# Patient Record
Sex: FEMALE | Race: WHITE | NOT HISPANIC OR LATINO | Employment: FULL TIME | URBAN - METROPOLITAN AREA
[De-identification: names, ages, dates, MRNs, and addresses within clinical notes are randomized per-mention and may not be internally consistent; named-entity substitution may affect disease eponyms.]

---

## 2023-05-16 DIAGNOSIS — E13.69 OTHER SPECIFIED DIABETES MELLITUS WITH OTHER SPECIFIED COMPLICATION, UNSPECIFIED WHETHER LONG TERM INSULIN USE (MULTI): ICD-10-CM

## 2023-05-16 DIAGNOSIS — E78.5 HYPERLIPIDEMIA, UNSPECIFIED HYPERLIPIDEMIA TYPE: ICD-10-CM

## 2023-05-16 DIAGNOSIS — I10 BENIGN ESSENTIAL HYPERTENSION: ICD-10-CM

## 2023-05-16 PROBLEM — R00.2 PALPITATIONS: Status: ACTIVE | Noted: 2023-05-16

## 2023-05-16 PROBLEM — H10.10 ALLERGIC CONJUNCTIVITIS: Status: ACTIVE | Noted: 2023-05-16

## 2023-05-16 PROBLEM — E11.9 DIABETES MELLITUS (MULTI): Status: ACTIVE | Noted: 2023-05-16

## 2023-05-16 PROBLEM — L71.9 ROSACEA: Status: ACTIVE | Noted: 2023-05-16

## 2023-05-16 PROBLEM — F51.01 IDIOPATHIC INSOMNIA: Status: ACTIVE | Noted: 2023-05-16

## 2023-05-16 PROBLEM — J02.9 PHARYNGITIS: Status: ACTIVE | Noted: 2023-05-16

## 2023-05-16 PROBLEM — L21.9 SEBORRHEA: Status: ACTIVE | Noted: 2023-05-16

## 2023-05-16 PROBLEM — J40 BRONCHITIS: Status: ACTIVE | Noted: 2023-05-16

## 2023-05-16 PROBLEM — N95.1 HOT FLASH, MENOPAUSAL: Status: ACTIVE | Noted: 2023-05-16

## 2023-05-16 RX ORDER — ATORVASTATIN CALCIUM 10 MG/1
1 TABLET, FILM COATED ORAL DAILY
COMMUNITY
Start: 2016-10-17 | End: 2023-05-16 | Stop reason: SDUPTHER

## 2023-05-16 RX ORDER — ATORVASTATIN CALCIUM 10 MG/1
10 TABLET, FILM COATED ORAL DAILY
Qty: 90 TABLET | Refills: 0 | Status: SHIPPED | OUTPATIENT
Start: 2023-05-16 | End: 2023-08-08

## 2023-05-16 RX ORDER — GLIMEPIRIDE 4 MG/1
4 TABLET ORAL 2 TIMES DAILY
Qty: 90 TABLET | Refills: 0 | Status: SHIPPED | OUTPATIENT
Start: 2023-05-16 | End: 2023-07-21 | Stop reason: ALTCHOICE

## 2023-05-16 RX ORDER — METFORMIN HYDROCHLORIDE 1000 MG/1
1000 TABLET ORAL 2 TIMES DAILY
Qty: 90 TABLET | Refills: 0 | Status: SHIPPED | OUTPATIENT
Start: 2023-05-16 | End: 2023-06-27

## 2023-05-16 RX ORDER — METFORMIN HYDROCHLORIDE 1000 MG/1
1 TABLET ORAL 2 TIMES DAILY
COMMUNITY
Start: 2016-09-30 | End: 2023-05-16 | Stop reason: SDUPTHER

## 2023-05-16 RX ORDER — LISINOPRIL 5 MG/1
1 TABLET ORAL DAILY
COMMUNITY
Start: 2019-11-18 | End: 2023-05-16 | Stop reason: SDUPTHER

## 2023-05-16 RX ORDER — GLIMEPIRIDE 4 MG/1
1 TABLET ORAL 2 TIMES DAILY
COMMUNITY
Start: 2016-10-17 | End: 2023-05-16 | Stop reason: SDUPTHER

## 2023-05-16 RX ORDER — LISINOPRIL 5 MG/1
5 TABLET ORAL DAILY
Qty: 90 TABLET | Refills: 0 | Status: SHIPPED | OUTPATIENT
Start: 2023-05-16 | End: 2023-08-08

## 2023-06-27 DIAGNOSIS — I10 BENIGN ESSENTIAL HYPERTENSION: ICD-10-CM

## 2023-06-27 DIAGNOSIS — E13.69 OTHER SPECIFIED DIABETES MELLITUS WITH OTHER SPECIFIED COMPLICATION, UNSPECIFIED WHETHER LONG TERM INSULIN USE (MULTI): ICD-10-CM

## 2023-06-27 DIAGNOSIS — E78.5 HYPERLIPIDEMIA, UNSPECIFIED HYPERLIPIDEMIA TYPE: ICD-10-CM

## 2023-06-27 RX ORDER — METFORMIN HYDROCHLORIDE 1000 MG/1
TABLET ORAL
Qty: 180 TABLET | Refills: 0 | Status: SHIPPED | OUTPATIENT
Start: 2023-06-27 | End: 2023-07-21 | Stop reason: SDUPTHER

## 2023-06-30 ENCOUNTER — APPOINTMENT (OUTPATIENT)
Dept: PRIMARY CARE | Facility: CLINIC | Age: 58
End: 2023-06-30
Payer: COMMERCIAL

## 2023-07-21 ENCOUNTER — OFFICE VISIT (OUTPATIENT)
Dept: PRIMARY CARE | Facility: CLINIC | Age: 58
End: 2023-07-21
Payer: COMMERCIAL

## 2023-07-21 VITALS
OXYGEN SATURATION: 97 % | SYSTOLIC BLOOD PRESSURE: 154 MMHG | HEART RATE: 98 BPM | BODY MASS INDEX: 29.82 KG/M2 | WEIGHT: 190 LBS | HEIGHT: 67 IN | DIASTOLIC BLOOD PRESSURE: 83 MMHG

## 2023-07-21 DIAGNOSIS — E13.69 OTHER SPECIFIED DIABETES MELLITUS WITH OTHER SPECIFIED COMPLICATION, UNSPECIFIED WHETHER LONG TERM INSULIN USE (MULTI): ICD-10-CM

## 2023-07-21 DIAGNOSIS — E78.5 HYPERLIPIDEMIA, UNSPECIFIED HYPERLIPIDEMIA TYPE: ICD-10-CM

## 2023-07-21 DIAGNOSIS — E11.9 TYPE 2 DIABETES MELLITUS WITHOUT COMPLICATION, WITHOUT LONG-TERM CURRENT USE OF INSULIN (MULTI): ICD-10-CM

## 2023-07-21 DIAGNOSIS — Z12.31 VISIT FOR SCREENING MAMMOGRAM: ICD-10-CM

## 2023-07-21 DIAGNOSIS — E78.5 DYSLIPIDEMIA, GOAL LDL BELOW 100: ICD-10-CM

## 2023-07-21 DIAGNOSIS — I10 BENIGN ESSENTIAL HYPERTENSION: Primary | ICD-10-CM

## 2023-07-21 DIAGNOSIS — N76.1 CHRONIC VAGINITIS: ICD-10-CM

## 2023-07-21 PROCEDURE — 87205 SMEAR GRAM STAIN: CPT

## 2023-07-21 PROCEDURE — 3077F SYST BP >= 140 MM HG: CPT | Performed by: INTERNAL MEDICINE

## 2023-07-21 PROCEDURE — 3079F DIAST BP 80-89 MM HG: CPT | Performed by: INTERNAL MEDICINE

## 2023-07-21 PROCEDURE — 99214 OFFICE O/P EST MOD 30 MIN: CPT | Performed by: INTERNAL MEDICINE

## 2023-07-21 PROCEDURE — 4010F ACE/ARB THERAPY RXD/TAKEN: CPT | Performed by: INTERNAL MEDICINE

## 2023-07-21 PROCEDURE — 1036F TOBACCO NON-USER: CPT | Performed by: INTERNAL MEDICINE

## 2023-07-21 RX ORDER — EMPAGLIFLOZIN 10 MG/1
1 TABLET, FILM COATED ORAL DAILY
COMMUNITY
Start: 2022-09-26 | End: 2023-08-01 | Stop reason: DRUGHIGH

## 2023-07-21 RX ORDER — ERGOCALCIFEROL 1.25 MG/1
CAPSULE ORAL
COMMUNITY

## 2023-07-21 RX ORDER — MULTIVITAMIN
TABLET ORAL
COMMUNITY
Start: 2002-10-03

## 2023-07-21 RX ORDER — IBUPROFEN 100 MG/5ML
1000 SUSPENSION, ORAL (FINAL DOSE FORM) ORAL DAILY
COMMUNITY

## 2023-07-21 RX ORDER — METFORMIN HYDROCHLORIDE 1000 MG/1
1000 TABLET ORAL 2 TIMES DAILY
Qty: 180 TABLET | Refills: 1 | Status: SHIPPED | OUTPATIENT
Start: 2023-07-21 | End: 2024-03-07

## 2023-07-21 ASSESSMENT — PATIENT HEALTH QUESTIONNAIRE - PHQ9
1. LITTLE INTEREST OR PLEASURE IN DOING THINGS: NOT AT ALL
SUM OF ALL RESPONSES TO PHQ9 QUESTIONS 1 AND 2: 0
2. FEELING DOWN, DEPRESSED OR HOPELESS: NOT AT ALL

## 2023-07-21 NOTE — PROGRESS NOTES
"Subjective   Patient ID: Bethany Mullen is a 57 y.o. female who presents for Annual Exam.    Sugar has been good, just broke her glucometer, needs a new one  Checks sugar couple a week  In the morning was   78 to 200 was the range    She started taking magnesium was getting cramps in her calfs.   Not helping  She drinks a lot of water.   She says pickle juice helps    She has never had a yeast infection in the past, has had some irritation, but no discharge.     Going next Friday for her labs  She has no sex drive.   Her last period was 5 years ago,   Has having poor libido for about 5 years.   Sex is not painful, but just recently    No cp or pressure  No sob  Bowels are regular  Urine leakage, cough, sneeze, laugh, just a dribble  No spotting    She had eczema, is using lubriderm and helping         Review of Systems    Objective   /87   Pulse 98   Ht 1.689 m (5' 6.5\")   Wt 86.2 kg (190 lb)   SpO2 97%   BMI 30.21 kg/m²     Physical Exam  Constitutional:       Appearance: Normal appearance.   Neck:      Vascular: No carotid bruit.   Cardiovascular:      Rate and Rhythm: Normal rate and regular rhythm.      Pulses: Normal pulses.      Heart sounds: No murmur heard.  Pulmonary:      Effort: Pulmonary effort is normal.      Breath sounds: No wheezing or rhonchi.   Chest:   Breasts:     Right: No mass or skin change.      Left: No mass or skin change.      Comments: Fibrocystic changes noted  Abdominal:      General: Abdomen is flat.      Palpations: Abdomen is soft. There is no mass.      Tenderness: There is no abdominal tenderness.      Comments: No HSM   Genitourinary:     Comments: Inner labia majora with thickening and pale base with areas that look abraised, red  Musculoskeletal:      Right lower leg: No edema.      Left lower leg: No edema.   Lymphadenopathy:      Cervical: No cervical adenopathy.   Skin:     Coloration: Skin is not jaundiced or pale.      Comments: Eczematous lesion, looks " "numular   Neurological:      Mental Status: She is alert and oriented to person, place, and time.   Psychiatric:      Comments: anxious         Assessment/Plan          Patient Instructions   Diabetes,   Get A1c  Get annual eye exam    Cholesterol, get fasting labs    Call [phone] to schedule mammogram    Colonoscopy due in 2026, biopsy normal in 2016    Bp high today, check at home 2-3 times a week, send in readings in 2-3 weeks    I recommend that you get the shingrix shots. Shingles vaccination requires a 2 shots series divided by 2 to 6 months. Get at the pharmacy. Ask the pharmacist \"how much\" prior to injected due cost varies based on your insurance. Shingrix can make you feel tired and achy for a few days after so do not get it prior to a big event     Need PAP in 2024, see Dr. Grider    Vaginal itching- checking for yeast due to jardiance, but looks like lichen sclerosis, see info  If negative for yeast would treat with clobetasol cream       "

## 2023-07-21 NOTE — PATIENT INSTRUCTIONS
"Diabetes,   Get A1c  Get annual eye exam    Cholesterol, get fasting labs    Call [phone] to schedule mammogram    Colonoscopy due in 2026, biopsy normal in 2016    Bp high today, check at home 2-3 times a week, send in readings in 2-3 weeks    I recommend that you get the shingrix shots. Shingles vaccination requires a 2 shots series divided by 2 to 6 months. Get at the pharmacy. Ask the pharmacist \"how much\" prior to injected due cost varies based on your insurance. Shingrix can make you feel tired and achy for a few days after so do not get it prior to a big event     Need PAP in 2024, see Dr. Grider    Vaginal itching- checking for yeast due to jardiance, but looks like lichen sclerosis, see info  If negative for yeast would treat with clobetasol cream      "

## 2023-07-22 LAB
CLUE CELLS: ABNORMAL
NUGENT SCORE: 4
VAGINITIS-BV + YEAST INTERPRETATION: ABNORMAL
YEAST: ABNORMAL

## 2023-07-25 DIAGNOSIS — N90.4 LICHEN SCLEROSUS OF FEMALE GENITALIA: Primary | ICD-10-CM

## 2023-07-25 RX ORDER — CLOBETASOL PROPIONATE 0.5 MG/G
OINTMENT TOPICAL 2 TIMES DAILY
Qty: 30 G | Refills: 0 | Status: SHIPPED | OUTPATIENT
Start: 2023-07-25 | End: 2024-03-21

## 2023-07-28 ENCOUNTER — LAB (OUTPATIENT)
Dept: LAB | Facility: LAB | Age: 58
End: 2023-07-28
Payer: COMMERCIAL

## 2023-07-28 DIAGNOSIS — E78.5 HYPERLIPIDEMIA, UNSPECIFIED HYPERLIPIDEMIA TYPE: ICD-10-CM

## 2023-07-28 DIAGNOSIS — E13.69 OTHER SPECIFIED DIABETES MELLITUS WITH OTHER SPECIFIED COMPLICATION, UNSPECIFIED WHETHER LONG TERM INSULIN USE (MULTI): ICD-10-CM

## 2023-07-28 LAB
ALANINE AMINOTRANSFERASE (SGPT) (U/L) IN SER/PLAS: 31 U/L (ref 7–45)
ALBUMIN (G/DL) IN SER/PLAS: 4.8 G/DL (ref 3.4–5)
ALKALINE PHOSPHATASE (U/L) IN SER/PLAS: 76 U/L (ref 33–110)
ANION GAP IN SER/PLAS: 16 MMOL/L (ref 10–20)
ASPARTATE AMINOTRANSFERASE (SGOT) (U/L) IN SER/PLAS: 22 U/L (ref 9–39)
BASOPHILS (10*3/UL) IN BLOOD BY AUTOMATED COUNT: 0.02 X10E9/L (ref 0–0.1)
BASOPHILS/100 LEUKOCYTES IN BLOOD BY AUTOMATED COUNT: 0.5 % (ref 0–2)
BILIRUBIN TOTAL (MG/DL) IN SER/PLAS: 0.7 MG/DL (ref 0–1.2)
CALCIUM (MG/DL) IN SER/PLAS: 9.9 MG/DL (ref 8.6–10.3)
CARBON DIOXIDE, TOTAL (MMOL/L) IN SER/PLAS: 29 MMOL/L (ref 21–32)
CHLORIDE (MMOL/L) IN SER/PLAS: 100 MMOL/L (ref 98–107)
CHOLESTEROL (MG/DL) IN SER/PLAS: 162 MG/DL (ref 0–199)
CHOLESTEROL IN HDL (MG/DL) IN SER/PLAS: 45.9 MG/DL
CHOLESTEROL/HDL RATIO: 3.5
CREATINE KINASE (U/L) IN SER/PLAS: 61 U/L (ref 0–215)
CREATININE (MG/DL) IN SER/PLAS: 0.55 MG/DL (ref 0.5–1.05)
EOSINOPHILS (10*3/UL) IN BLOOD BY AUTOMATED COUNT: 0.11 X10E9/L (ref 0–0.7)
EOSINOPHILS/100 LEUKOCYTES IN BLOOD BY AUTOMATED COUNT: 2.9 % (ref 0–6)
ERYTHROCYTE DISTRIBUTION WIDTH (RATIO) BY AUTOMATED COUNT: 12.2 % (ref 11.5–14.5)
ERYTHROCYTE MEAN CORPUSCULAR HEMOGLOBIN CONCENTRATION (G/DL) BY AUTOMATED: 32.4 G/DL (ref 32–36)
ERYTHROCYTE MEAN CORPUSCULAR VOLUME (FL) BY AUTOMATED COUNT: 91 FL (ref 80–100)
ERYTHROCYTES (10*6/UL) IN BLOOD BY AUTOMATED COUNT: 5.18 X10E12/L (ref 4–5.2)
ESTIMATED AVERAGE GLUCOSE FOR HBA1C: 217 MG/DL
GFR FEMALE: >90 ML/MIN/1.73M2
GLUCOSE (MG/DL) IN SER/PLAS: 186 MG/DL (ref 74–99)
HEMATOCRIT (%) IN BLOOD BY AUTOMATED COUNT: 47.2 % (ref 36–46)
HEMOGLOBIN (G/DL) IN BLOOD: 15.3 G/DL (ref 12–16)
HEMOGLOBIN A1C/HEMOGLOBIN TOTAL IN BLOOD: 9.2 %
IMMATURE GRANULOCYTES/100 LEUKOCYTES IN BLOOD BY AUTOMATED COUNT: 0.3 % (ref 0–0.9)
LDL: 85 MG/DL (ref 0–99)
LEUKOCYTES (10*3/UL) IN BLOOD BY AUTOMATED COUNT: 3.7 X10E9/L (ref 4.4–11.3)
LYMPHOCYTES (10*3/UL) IN BLOOD BY AUTOMATED COUNT: 0.9 X10E9/L (ref 1.2–4.8)
LYMPHOCYTES/100 LEUKOCYTES IN BLOOD BY AUTOMATED COUNT: 24.1 % (ref 13–44)
MONOCYTES (10*3/UL) IN BLOOD BY AUTOMATED COUNT: 0.31 X10E9/L (ref 0.1–1)
MONOCYTES/100 LEUKOCYTES IN BLOOD BY AUTOMATED COUNT: 8.3 % (ref 2–10)
NEUTROPHILS (10*3/UL) IN BLOOD BY AUTOMATED COUNT: 2.38 X10E9/L (ref 1.2–7.7)
NEUTROPHILS/100 LEUKOCYTES IN BLOOD BY AUTOMATED COUNT: 63.9 % (ref 40–80)
PLATELETS (10*3/UL) IN BLOOD AUTOMATED COUNT: 177 X10E9/L (ref 150–450)
POTASSIUM (MMOL/L) IN SER/PLAS: 4.1 MMOL/L (ref 3.5–5.3)
PROTEIN TOTAL: 7.1 G/DL (ref 6.4–8.2)
SODIUM (MMOL/L) IN SER/PLAS: 141 MMOL/L (ref 136–145)
THYROTROPIN (MIU/L) IN SER/PLAS BY DETECTION LIMIT <= 0.05 MIU/L: 3.19 MIU/L (ref 0.44–3.98)
TRIGLYCERIDE (MG/DL) IN SER/PLAS: 154 MG/DL (ref 0–149)
UREA NITROGEN (MG/DL) IN SER/PLAS: 14 MG/DL (ref 6–23)
VLDL: 31 MG/DL (ref 0–40)

## 2023-07-28 PROCEDURE — 36415 COLL VENOUS BLD VENIPUNCTURE: CPT

## 2023-07-28 PROCEDURE — 85025 COMPLETE CBC W/AUTO DIFF WBC: CPT

## 2023-07-28 PROCEDURE — 82550 ASSAY OF CK (CPK): CPT

## 2023-07-28 PROCEDURE — 84443 ASSAY THYROID STIM HORMONE: CPT

## 2023-07-28 PROCEDURE — 80053 COMPREHEN METABOLIC PANEL: CPT

## 2023-07-28 PROCEDURE — 83036 HEMOGLOBIN GLYCOSYLATED A1C: CPT

## 2023-07-28 PROCEDURE — 80061 LIPID PANEL: CPT

## 2023-08-01 DIAGNOSIS — E11.9 TYPE 2 DIABETES MELLITUS WITHOUT COMPLICATION, WITHOUT LONG-TERM CURRENT USE OF INSULIN (MULTI): Primary | ICD-10-CM

## 2023-08-08 DIAGNOSIS — E13.69 OTHER SPECIFIED DIABETES MELLITUS WITH OTHER SPECIFIED COMPLICATION, UNSPECIFIED WHETHER LONG TERM INSULIN USE (MULTI): ICD-10-CM

## 2023-08-08 DIAGNOSIS — E78.5 HYPERLIPIDEMIA, UNSPECIFIED HYPERLIPIDEMIA TYPE: ICD-10-CM

## 2023-08-08 DIAGNOSIS — I10 BENIGN ESSENTIAL HYPERTENSION: ICD-10-CM

## 2023-08-08 RX ORDER — ATORVASTATIN CALCIUM 10 MG/1
10 TABLET, FILM COATED ORAL DAILY
Qty: 90 TABLET | Refills: 0 | Status: SHIPPED | OUTPATIENT
Start: 2023-08-08 | End: 2023-11-25

## 2023-08-08 RX ORDER — LISINOPRIL 5 MG/1
5 TABLET ORAL DAILY
Qty: 90 TABLET | Refills: 0 | Status: SHIPPED | OUTPATIENT
Start: 2023-08-08 | End: 2023-11-25

## 2023-09-23 LAB
HSV 1 PCR QUAL, SKIN/MUCOSA: DETECTED
HSV 2 PCR QUAL, SKIN/MUCOSA: NOT DETECTED

## 2023-10-01 ENCOUNTER — PHARMACY VISIT (OUTPATIENT)
Dept: PHARMACY | Facility: CLINIC | Age: 58
End: 2023-10-01
Payer: COMMERCIAL

## 2023-10-02 ENCOUNTER — PHARMACY VISIT (OUTPATIENT)
Dept: PHARMACY | Facility: CLINIC | Age: 58
End: 2023-10-02
Payer: COMMERCIAL

## 2023-10-02 PROCEDURE — RXMED WILLOW AMBULATORY MEDICATION CHARGE

## 2023-11-01 DIAGNOSIS — E11.9 TYPE 2 DIABETES MELLITUS WITHOUT COMPLICATION, WITHOUT LONG-TERM CURRENT USE OF INSULIN (MULTI): ICD-10-CM

## 2023-11-01 NOTE — TELEPHONE ENCOUNTER
Requested Prescriptions     Pending Prescriptions Disp Refills    empagliflozin (Jardiance) 25 mg 90 tablet 0     Sig: TAKE 1 TABLET BY MOUTH ONCE DAILY

## 2023-11-03 DIAGNOSIS — E11.9 TYPE 2 DIABETES MELLITUS WITHOUT COMPLICATION, WITHOUT LONG-TERM CURRENT USE OF INSULIN (MULTI): ICD-10-CM

## 2023-11-23 DIAGNOSIS — E78.5 HYPERLIPIDEMIA, UNSPECIFIED HYPERLIPIDEMIA TYPE: ICD-10-CM

## 2023-11-23 DIAGNOSIS — I10 BENIGN ESSENTIAL HYPERTENSION: ICD-10-CM

## 2023-11-23 DIAGNOSIS — E13.69 OTHER SPECIFIED DIABETES MELLITUS WITH OTHER SPECIFIED COMPLICATION, UNSPECIFIED WHETHER LONG TERM INSULIN USE (MULTI): ICD-10-CM

## 2023-11-25 RX ORDER — ATORVASTATIN CALCIUM 10 MG/1
10 TABLET, FILM COATED ORAL DAILY
Qty: 90 TABLET | Refills: 1 | Status: SHIPPED | OUTPATIENT
Start: 2023-11-25 | End: 2024-05-30

## 2023-11-25 RX ORDER — LISINOPRIL 5 MG/1
5 TABLET ORAL DAILY
Qty: 90 TABLET | Refills: 3 | Status: SHIPPED | OUTPATIENT
Start: 2023-11-25

## 2023-11-30 ENCOUNTER — PHARMACY VISIT (OUTPATIENT)
Dept: PHARMACY | Facility: CLINIC | Age: 58
End: 2023-11-30
Payer: COMMERCIAL

## 2023-11-30 PROCEDURE — RXMED WILLOW AMBULATORY MEDICATION CHARGE

## 2023-12-29 ENCOUNTER — APPOINTMENT (OUTPATIENT)
Dept: PRIMARY CARE | Facility: CLINIC | Age: 58
End: 2023-12-29
Payer: COMMERCIAL

## 2024-01-03 PROCEDURE — RXMED WILLOW AMBULATORY MEDICATION CHARGE

## 2024-01-04 ENCOUNTER — PHARMACY VISIT (OUTPATIENT)
Dept: PHARMACY | Facility: CLINIC | Age: 59
End: 2024-01-04
Payer: COMMERCIAL

## 2024-01-20 ENCOUNTER — LAB (OUTPATIENT)
Dept: LAB | Facility: LAB | Age: 59
End: 2024-01-20
Payer: COMMERCIAL

## 2024-01-20 DIAGNOSIS — E11.9 TYPE 2 DIABETES MELLITUS WITHOUT COMPLICATION, WITHOUT LONG-TERM CURRENT USE OF INSULIN (MULTI): ICD-10-CM

## 2024-01-20 LAB
EST. AVERAGE GLUCOSE BLD GHB EST-MCNC: 189 MG/DL
HBA1C MFR BLD: 8.2 %

## 2024-01-20 PROCEDURE — 36415 COLL VENOUS BLD VENIPUNCTURE: CPT

## 2024-01-20 PROCEDURE — 83036 HEMOGLOBIN GLYCOSYLATED A1C: CPT

## 2024-01-26 ENCOUNTER — OFFICE VISIT (OUTPATIENT)
Dept: PRIMARY CARE | Facility: CLINIC | Age: 59
End: 2024-01-26
Payer: COMMERCIAL

## 2024-01-26 VITALS
DIASTOLIC BLOOD PRESSURE: 88 MMHG | HEART RATE: 87 BPM | HEIGHT: 67 IN | BODY MASS INDEX: 28.88 KG/M2 | SYSTOLIC BLOOD PRESSURE: 138 MMHG | WEIGHT: 184 LBS

## 2024-01-26 DIAGNOSIS — E11.65 TYPE 2 DIABETES MELLITUS WITH HYPERGLYCEMIA, WITHOUT LONG-TERM CURRENT USE OF INSULIN (MULTI): Primary | ICD-10-CM

## 2024-01-26 DIAGNOSIS — I10 BENIGN ESSENTIAL HYPERTENSION: ICD-10-CM

## 2024-01-26 PROBLEM — D22.5 MELANOCYTIC NEVI OF TRUNK: Status: ACTIVE | Noted: 2018-10-19

## 2024-01-26 PROBLEM — D22.60 MELANOCYTIC NEVI OF UNSPECIFIED UPPER LIMB, INCLUDING SHOULDER: Status: ACTIVE | Noted: 2018-10-19

## 2024-01-26 PROBLEM — L91.8 OTHER HYPERTROPHIC DISORDERS OF THE SKIN: Status: ACTIVE | Noted: 2018-10-19

## 2024-01-26 PROBLEM — L82.0 INFLAMED SEBORRHEIC KERATOSIS: Status: ACTIVE | Noted: 2018-10-19

## 2024-01-26 PROBLEM — L82.1 OTHER SEBORRHEIC KERATOSIS: Status: ACTIVE | Noted: 2018-10-19

## 2024-01-26 PROBLEM — Z86.39 HISTORY OF DIABETES MELLITUS: Status: ACTIVE | Noted: 2024-01-26

## 2024-01-26 PROBLEM — L81.4 OTHER MELANIN HYPERPIGMENTATION: Status: ACTIVE | Noted: 2018-10-19

## 2024-01-26 PROBLEM — D18.01 HEMANGIOMA OF SKIN AND SUBCUTANEOUS TISSUE: Status: ACTIVE | Noted: 2018-10-19

## 2024-01-26 PROCEDURE — 90686 IIV4 VACC NO PRSV 0.5 ML IM: CPT | Performed by: INTERNAL MEDICINE

## 2024-01-26 PROCEDURE — 1036F TOBACCO NON-USER: CPT | Performed by: INTERNAL MEDICINE

## 2024-01-26 PROCEDURE — 90471 IMMUNIZATION ADMIN: CPT | Performed by: INTERNAL MEDICINE

## 2024-01-26 PROCEDURE — 4010F ACE/ARB THERAPY RXD/TAKEN: CPT | Performed by: INTERNAL MEDICINE

## 2024-01-26 PROCEDURE — 3079F DIAST BP 80-89 MM HG: CPT | Performed by: INTERNAL MEDICINE

## 2024-01-26 PROCEDURE — 3052F HG A1C>EQUAL 8.0%<EQUAL 9.0%: CPT | Performed by: INTERNAL MEDICINE

## 2024-01-26 PROCEDURE — 3075F SYST BP GE 130 - 139MM HG: CPT | Performed by: INTERNAL MEDICINE

## 2024-01-26 PROCEDURE — 99214 OFFICE O/P EST MOD 30 MIN: CPT | Performed by: INTERNAL MEDICINE

## 2024-01-26 NOTE — PROGRESS NOTES
Subjective   Patient ID: Bethany Mullen is a 58 y.o. female who presents for Annual Exam.    HPI     Review of Systems    Objective   There were no vitals taken for this visit.    Physical Exam    Assessment/Plan

## 2024-01-26 NOTE — PATIENT INSTRUCTIONS
Diabetes, not well controlled with metformin and jardiance at max doses  Great job with checking sugar, check before dinner once a week.  Get regular exercise, walking is good.   Add ozempic 0.25mg  injected weekly for 4 weeks, send message or call to let me know if okay to increase to 0.5mg weekly for 1 month and if tolerated will increase to 1 mg weekly    Recheck in July for 6 month recheck    Bp is good    Get shingrix shots, 2 shot series, divided by 2 to 6 months    Flu shot today.  
BMP/CBC/PT/PTT/INR/EKG

## 2024-01-26 NOTE — PROGRESS NOTES
"Subjective   Patient ID: Bethany Mullen is a 58 y.o. female who presents for Annual Exam.    She has been watching her diet well  Her sugar is never under 100, has been 100-230, checks first thing in the morning.   She doesn't check every day.   Rarely checks her sugar later in the day.   She does so well at work during the day with her diet, at night, after dinner, she would like something to curb her appetite.   A friend of hers who was on ozempic.   No personal or family history of thyroid cancer  She does not exercise in the winter, and has had stress at work    No cp or pressure  No sob  No LH or palp  Bowels are regular  No UTI symptoms or vaginal yeast infection    She had an episdoe of herpes. Never had before.     Home bp is 126/70         Review of Systems    Objective   /87   Pulse 87   Ht 1.689 m (5' 6.5\")   Wt 83.5 kg (184 lb)   BMI 29.25 kg/m²     Physical Exam  Constitutional:       Appearance: Normal appearance.   Neck:      Vascular: No carotid bruit.   Cardiovascular:      Rate and Rhythm: Normal rate and regular rhythm.      Heart sounds: No murmur heard.  Pulmonary:      Effort: Pulmonary effort is normal.      Breath sounds: Normal breath sounds.   Abdominal:      General: Abdomen is flat.      Palpations: Abdomen is soft.      Tenderness: There is no abdominal tenderness.   Musculoskeletal:      Right lower leg: No edema.      Left lower leg: No edema.   Skin:     Coloration: Skin is not jaundiced or pale.      Comments: Plaques/thick on back    Neurological:      Mental Status: She is alert and oriented to person, place, and time.   Psychiatric:         Mood and Affect: Mood normal.         Assessment/Plan          Patient Instructions   Diabetes, not well controlled with metformin and jardiance at max doses  Great job with checking sugar, check before dinner once a week.  Get regular exercise, walking is good.   Add ozempic 0.25mg  injected weekly for 4 weeks, send message or " call to let me know if okay to increase to 0.5mg weekly for 1 month and if tolerated will increase to 1 mg weekly    Recheck in July for 6 month recheck    Bp is good    Get shingrix shots, 2 shot series, divided by 2 to 6 months    Flu shot today.

## 2024-02-01 PROCEDURE — RXMED WILLOW AMBULATORY MEDICATION CHARGE

## 2024-02-02 ENCOUNTER — PHARMACY VISIT (OUTPATIENT)
Dept: PHARMACY | Facility: CLINIC | Age: 59
End: 2024-02-02
Payer: COMMERCIAL

## 2024-02-02 PROCEDURE — RXMED WILLOW AMBULATORY MEDICATION CHARGE

## 2024-02-05 DIAGNOSIS — E11.9 TYPE 2 DIABETES MELLITUS WITHOUT COMPLICATION, WITHOUT LONG-TERM CURRENT USE OF INSULIN (MULTI): ICD-10-CM

## 2024-03-04 PROCEDURE — RXMED WILLOW AMBULATORY MEDICATION CHARGE

## 2024-03-06 ENCOUNTER — PHARMACY VISIT (OUTPATIENT)
Dept: PHARMACY | Facility: CLINIC | Age: 59
End: 2024-03-06
Payer: COMMERCIAL

## 2024-03-07 DIAGNOSIS — E78.5 HYPERLIPIDEMIA, UNSPECIFIED HYPERLIPIDEMIA TYPE: ICD-10-CM

## 2024-03-07 DIAGNOSIS — I10 BENIGN ESSENTIAL HYPERTENSION: ICD-10-CM

## 2024-03-07 DIAGNOSIS — E13.69 OTHER SPECIFIED DIABETES MELLITUS WITH OTHER SPECIFIED COMPLICATION, UNSPECIFIED WHETHER LONG TERM INSULIN USE (MULTI): ICD-10-CM

## 2024-03-07 RX ORDER — METFORMIN HYDROCHLORIDE 1000 MG/1
1000 TABLET ORAL 2 TIMES DAILY
Qty: 180 TABLET | Refills: 0 | Status: SHIPPED | OUTPATIENT
Start: 2024-03-07

## 2024-04-08 PROCEDURE — RXMED WILLOW AMBULATORY MEDICATION CHARGE

## 2024-04-11 ENCOUNTER — PHARMACY VISIT (OUTPATIENT)
Dept: PHARMACY | Facility: CLINIC | Age: 59
End: 2024-04-11
Payer: COMMERCIAL

## 2024-05-20 PROCEDURE — RXMED WILLOW AMBULATORY MEDICATION CHARGE

## 2024-05-22 ENCOUNTER — PHARMACY VISIT (OUTPATIENT)
Dept: PHARMACY | Facility: CLINIC | Age: 59
End: 2024-05-22
Payer: COMMERCIAL

## 2024-05-29 DIAGNOSIS — I10 BENIGN ESSENTIAL HYPERTENSION: ICD-10-CM

## 2024-05-29 DIAGNOSIS — E78.5 HYPERLIPIDEMIA, UNSPECIFIED HYPERLIPIDEMIA TYPE: ICD-10-CM

## 2024-05-29 DIAGNOSIS — E13.69 OTHER SPECIFIED DIABETES MELLITUS WITH OTHER SPECIFIED COMPLICATION, UNSPECIFIED WHETHER LONG TERM INSULIN USE (MULTI): ICD-10-CM

## 2024-05-30 DIAGNOSIS — E11.9 TYPE 2 DIABETES MELLITUS WITHOUT COMPLICATION, WITHOUT LONG-TERM CURRENT USE OF INSULIN (MULTI): Primary | ICD-10-CM

## 2024-05-30 DIAGNOSIS — E78.5 DYSLIPIDEMIA, GOAL LDL BELOW 100: ICD-10-CM

## 2024-05-30 RX ORDER — ATORVASTATIN CALCIUM 10 MG/1
10 TABLET, FILM COATED ORAL DAILY
Qty: 90 TABLET | Refills: 0 | Status: SHIPPED | OUTPATIENT
Start: 2024-05-30

## 2024-06-14 ENCOUNTER — LAB (OUTPATIENT)
Dept: LAB | Facility: LAB | Age: 59
End: 2024-06-14
Payer: COMMERCIAL

## 2024-06-14 DIAGNOSIS — E11.9 TYPE 2 DIABETES MELLITUS WITHOUT COMPLICATION, WITHOUT LONG-TERM CURRENT USE OF INSULIN (MULTI): ICD-10-CM

## 2024-06-14 DIAGNOSIS — I10 BENIGN ESSENTIAL HYPERTENSION: ICD-10-CM

## 2024-06-14 DIAGNOSIS — E78.5 HYPERLIPIDEMIA, UNSPECIFIED HYPERLIPIDEMIA TYPE: ICD-10-CM

## 2024-06-14 DIAGNOSIS — E13.69 OTHER SPECIFIED DIABETES MELLITUS WITH OTHER SPECIFIED COMPLICATION, UNSPECIFIED WHETHER LONG TERM INSULIN USE (MULTI): ICD-10-CM

## 2024-06-14 DIAGNOSIS — E11.65 TYPE 2 DIABETES MELLITUS WITH HYPERGLYCEMIA, WITHOUT LONG-TERM CURRENT USE OF INSULIN (MULTI): ICD-10-CM

## 2024-06-14 DIAGNOSIS — E78.5 DYSLIPIDEMIA, GOAL LDL BELOW 100: ICD-10-CM

## 2024-06-14 LAB
ALBUMIN SERPL BCP-MCNC: 4.8 G/DL (ref 3.4–5)
ALP SERPL-CCNC: 65 U/L (ref 33–110)
ALT SERPL W P-5'-P-CCNC: 36 U/L (ref 7–45)
ANION GAP SERPL CALC-SCNC: 15 MMOL/L (ref 10–20)
AST SERPL W P-5'-P-CCNC: 29 U/L (ref 9–39)
BASOPHILS # BLD AUTO: 0.02 X10*3/UL (ref 0–0.1)
BASOPHILS NFR BLD AUTO: 0.6 %
BILIRUB SERPL-MCNC: 0.7 MG/DL (ref 0–1.2)
BUN SERPL-MCNC: 24 MG/DL (ref 6–23)
CALCIUM SERPL-MCNC: 9.8 MG/DL (ref 8.6–10.3)
CHLORIDE SERPL-SCNC: 97 MMOL/L (ref 98–107)
CHOLEST SERPL-MCNC: 173 MG/DL (ref 0–199)
CHOLESTEROL/HDL RATIO: 3.9
CK SERPL-CCNC: 75 U/L (ref 0–215)
CO2 SERPL-SCNC: 29 MMOL/L (ref 21–32)
CREAT SERPL-MCNC: 0.53 MG/DL (ref 0.5–1.05)
EGFRCR SERPLBLD CKD-EPI 2021: >90 ML/MIN/1.73M*2
EOSINOPHIL # BLD AUTO: 0.07 X10*3/UL (ref 0–0.7)
EOSINOPHIL NFR BLD AUTO: 2.1 %
ERYTHROCYTE [DISTWIDTH] IN BLOOD BY AUTOMATED COUNT: 12.5 % (ref 11.5–14.5)
EST. AVERAGE GLUCOSE BLD GHB EST-MCNC: 157 MG/DL
GLUCOSE SERPL-MCNC: 162 MG/DL (ref 74–99)
HBA1C MFR BLD: 7.1 %
HCT VFR BLD AUTO: 46.5 % (ref 36–46)
HDLC SERPL-MCNC: 44.1 MG/DL
HGB BLD-MCNC: 15.2 G/DL (ref 12–16)
IMM GRANULOCYTES # BLD AUTO: 0.01 X10*3/UL (ref 0–0.7)
IMM GRANULOCYTES NFR BLD AUTO: 0.3 % (ref 0–0.9)
LDLC SERPL CALC-MCNC: 106 MG/DL
LYMPHOCYTES # BLD AUTO: 0.79 X10*3/UL (ref 1.2–4.8)
LYMPHOCYTES NFR BLD AUTO: 23.4 %
MCH RBC QN AUTO: 30.5 PG (ref 26–34)
MCHC RBC AUTO-ENTMCNC: 32.7 G/DL (ref 32–36)
MCV RBC AUTO: 93 FL (ref 80–100)
MONOCYTES # BLD AUTO: 0.29 X10*3/UL (ref 0.1–1)
MONOCYTES NFR BLD AUTO: 8.6 %
NEUTROPHILS # BLD AUTO: 2.19 X10*3/UL (ref 1.2–7.7)
NEUTROPHILS NFR BLD AUTO: 65 %
NON HDL CHOLESTEROL: 129 MG/DL (ref 0–149)
NRBC BLD-RTO: 0 /100 WBCS (ref 0–0)
PLATELET # BLD AUTO: 160 X10*3/UL (ref 150–450)
POTASSIUM SERPL-SCNC: 4.4 MMOL/L (ref 3.5–5.3)
PROT SERPL-MCNC: 7.3 G/DL (ref 6.4–8.2)
RBC # BLD AUTO: 4.99 X10*6/UL (ref 4–5.2)
SODIUM SERPL-SCNC: 137 MMOL/L (ref 136–145)
TRIGL SERPL-MCNC: 115 MG/DL (ref 0–149)
TSH SERPL-ACNC: 2.45 MIU/L (ref 0.44–3.98)
VLDL: 23 MG/DL (ref 0–40)
WBC # BLD AUTO: 3.4 X10*3/UL (ref 4.4–11.3)

## 2024-06-14 PROCEDURE — 83036 HEMOGLOBIN GLYCOSYLATED A1C: CPT

## 2024-06-14 PROCEDURE — 82550 ASSAY OF CK (CPK): CPT

## 2024-06-14 PROCEDURE — 80053 COMPREHEN METABOLIC PANEL: CPT

## 2024-06-14 PROCEDURE — 84443 ASSAY THYROID STIM HORMONE: CPT

## 2024-06-14 PROCEDURE — 85025 COMPLETE CBC W/AUTO DIFF WBC: CPT

## 2024-06-14 PROCEDURE — RXMED WILLOW AMBULATORY MEDICATION CHARGE

## 2024-06-14 PROCEDURE — 80061 LIPID PANEL: CPT

## 2024-06-14 RX ORDER — METFORMIN HYDROCHLORIDE 1000 MG/1
1000 TABLET ORAL 2 TIMES DAILY
Qty: 180 TABLET | Refills: 0 | Status: SHIPPED | OUTPATIENT
Start: 2024-06-14

## 2024-06-14 NOTE — TELEPHONE ENCOUNTER
Requested Prescriptions     Pending Prescriptions Disp Refills    semaglutide 0.25 mg or 0.5 mg (2 mg/3 mL) pen injector 3 mL 1     Sig: Inject 0.25 mg under the skin 1 (one) time per week.    metFORMIN (Glucophage) 1,000 mg tablet 180 tablet 0     Sig: Take 1 tablet (1,000 mg) by mouth 2 times a day.

## 2024-06-17 ENCOUNTER — PHARMACY VISIT (OUTPATIENT)
Dept: PHARMACY | Facility: CLINIC | Age: 59
End: 2024-06-17
Payer: COMMERCIAL

## 2024-06-21 PROCEDURE — RXMED WILLOW AMBULATORY MEDICATION CHARGE

## 2024-06-26 ENCOUNTER — PHARMACY VISIT (OUTPATIENT)
Dept: PHARMACY | Facility: CLINIC | Age: 59
End: 2024-06-26
Payer: COMMERCIAL

## 2024-07-09 ENCOUNTER — PHARMACY VISIT (OUTPATIENT)
Dept: PHARMACY | Facility: CLINIC | Age: 59
End: 2024-07-09
Payer: COMMERCIAL

## 2024-07-09 PROCEDURE — RXMED WILLOW AMBULATORY MEDICATION CHARGE

## 2024-07-19 ENCOUNTER — APPOINTMENT (OUTPATIENT)
Dept: PRIMARY CARE | Facility: CLINIC | Age: 59
End: 2024-07-19
Payer: COMMERCIAL

## 2024-07-19 VITALS
SYSTOLIC BLOOD PRESSURE: 128 MMHG | OXYGEN SATURATION: 95 % | WEIGHT: 180 LBS | HEART RATE: 67 BPM | BODY MASS INDEX: 28.62 KG/M2 | DIASTOLIC BLOOD PRESSURE: 74 MMHG

## 2024-07-19 DIAGNOSIS — I10 BENIGN ESSENTIAL HYPERTENSION: ICD-10-CM

## 2024-07-19 DIAGNOSIS — E11.9 TYPE 2 DIABETES MELLITUS WITHOUT COMPLICATION, WITHOUT LONG-TERM CURRENT USE OF INSULIN (MULTI): ICD-10-CM

## 2024-07-19 DIAGNOSIS — E78.00 PURE HYPERCHOLESTEROLEMIA: Primary | ICD-10-CM

## 2024-07-19 DIAGNOSIS — Z12.31 VISIT FOR SCREENING MAMMOGRAM: ICD-10-CM

## 2024-07-19 PROCEDURE — 3078F DIAST BP <80 MM HG: CPT | Performed by: INTERNAL MEDICINE

## 2024-07-19 PROCEDURE — 1036F TOBACCO NON-USER: CPT | Performed by: INTERNAL MEDICINE

## 2024-07-19 PROCEDURE — 3049F LDL-C 100-129 MG/DL: CPT | Performed by: INTERNAL MEDICINE

## 2024-07-19 PROCEDURE — 3074F SYST BP LT 130 MM HG: CPT | Performed by: INTERNAL MEDICINE

## 2024-07-19 PROCEDURE — 99214 OFFICE O/P EST MOD 30 MIN: CPT | Performed by: INTERNAL MEDICINE

## 2024-07-19 PROCEDURE — 3051F HG A1C>EQUAL 7.0%<8.0%: CPT | Performed by: INTERNAL MEDICINE

## 2024-07-19 PROCEDURE — 4010F ACE/ARB THERAPY RXD/TAKEN: CPT | Performed by: INTERNAL MEDICINE

## 2024-07-19 NOTE — PROGRESS NOTES
Subjective   Patient ID: Bethany Mullen is a 58 y.o. female who presents for Follow-up (6 month follow up and go over results ).    HPI     Review of Systems    Objective   There were no vitals taken for this visit.    Physical Exam    Assessment/Plan           Watch your child at all times near play equipment and stairs. If your child is climbing out of his or her crib, change to a toddler bed. · Keep cleaning products and medicines in locked cabinets out of your child's reach. Keep the number for Poison Control (2-363.484.6311) in or near your phone. · Tell your doctor if your child spends a lot of time in a house built before 1978. The paint could have lead in it, which can be harmful. · Help your child brush his or her teeth every day. For children this age, use a tiny amount of toothpaste with fluoride (the size of a grain of rice). Discipline  · Teach your child good behavior. Catch your child being good and respond to that behavior. · Use your body language, such as looking sad, to let your child know you do not like his or her behavior. A child this age [de-identified] misbehave 27 times a day. · Do not spank your child. · If you are having problems with discipline, talk to your doctor to find out what you can do to help your child. Feeding  · Offer a variety of healthy foods each day, including fruits, well-cooked vegetables, low-sugar cereal, yogurt, whole-grain breads and crackers, lean meat, fish, and tofu. Kids need to eat at least every 3 or 4 hours. · Do not give your child foods that may cause choking, such as nuts, whole grapes, hard or sticky candy, or popcorn. · Give your child healthy snacks. Even if your child does not seem to like them at first, keep trying. Buy snack foods made from wheat, corn, rice, oats, or other grains, such as breads, cereals, tortillas, noodles, crackers, and muffins. Immunizations  · Make sure your baby gets all the recommended childhood vaccines. They will help keep your baby healthy and prevent the spread of disease. When should you call for help? Watch closely for changes in your child's health, and be sure to contact your doctor if:  · You are concerned that your child is not growing or developing normally.   · You are kinds of foods and watching out for choking hazards. · Watch your child at all times near the street or in a parking lot. Drivers may not be able to see small children. Know where your child is and check carefully before backing your car out of the driveway. · Watch your child at all times when he or she is near water, including pools, hot tubs, buckets, bathtubs, and toilets. · For every ride in a car, secure your child into a properly installed car seat that meets all current safety standards. For questions about car seats, call the [employer] at 3-366.161.3330. · Make sure your child cannot get burned. Keep hot pots, curling irons, irons, and coffee cups out of his or her reach. Put plastic plugs in all electrical sockets. Put in smoke detectors and check the batteries regularly. · Put locks or guards on all windows above the first floor. Watch your child at all times near play equipment and stairs. If your child is climbing out of his or her crib, change to a toddler bed. · Keep cleaning products and medicines in locked cabinets out of your child's reach. Keep the number for Poison Control (2-361.789.1450) in or near your phone. · Tell your doctor if your child spends a lot of time in a house built before 1978. The paint could have lead in it, which can be harmful. · Help your child brush his or her teeth every day. For children this age, use a tiny amount of toothpaste with fluoride (the size of a grain of rice). Give your child loving discipline  · Use facial expressions and body language to show you are sad or glad about your child's behavior. Shake your head \"no,\" with a adamson look on your face, when your toddler does something you do not like. Reward good behavior with a smile and a positive comment. (\"I like how you play gently with your toys. \")  · Redirect your child.  If your child cannot play with a toy without throwing it, put the toy away and show your child 2017  Content Version: 11.3  © 2529-5125 [employer]. Care instructions adapted under license by [facility] ([facility]). If you have questions about a medical condition or this instruction, always ask your healthcare professional. [address]  any warranty or liability for your use of this information. Follow with specialist as scheduled. Schedule with physical and occupational therapy. Continue with [facility].

## 2024-07-19 NOTE — PROGRESS NOTES
Subjective   Patient ID: Bethany Mullen is a 58 y.o. female who presents for Follow-up (6 month follow up and go over results ).    Her sugars have been 100 to 160  Is on the metformin, cut to 1 daily  Is taking the jardiance and ozempic 0.25mg dose. She pays $25 for ozempic and $10,   No nausea, bowels are regular.   Her goal weight is 160.          Review of Systems    Objective   /89   Pulse 67   Wt 81.6 kg (180 lb)   SpO2 95%   BMI 28.62 kg/m²     Physical Exam  Constitutional:       Appearance: Normal appearance.   Neck:      Vascular: No carotid bruit.   Cardiovascular:      Rate and Rhythm: Normal rate and regular rhythm.      Heart sounds: No murmur heard.  Pulmonary:      Effort: Pulmonary effort is normal.      Breath sounds: Normal breath sounds.   Skin:     Coloration: Skin is not jaundiced or pale.   Neurological:      Mental Status: She is alert.   Psychiatric:         Mood and Affect: Mood normal.         Assessment/Plan     Patient Instructions   Diabetes,   A1c much better  Continue jardiance daily  Continue metformin 500mg daily  Increase ozempic to .5mg weekly  Get annual eye exam    Cholesterol, LDL goal under 100, up slightly, work on diet  If still over 100 in 6 months would increase atorvastatin    Call [phone] to schedule mammogram    Colonoscopy due in 2026, biopsy normal in 2016    Bp normal on repeat    I recommend that you get the shingrix shots. Shingles vaccination requires a 2 shots series divided by 2 to 6 months. Get at the pharmacy.  Shingrix can make you feel tired and achy for a few days after so do not get it prior to a big event     Get Tdap at the pharmacy, recommended every 10 years (plus need pertussis if babysitting your new grandson)    Pap normal in 2023     Night time leg cramps. Fluids, tonic water.

## 2024-07-19 NOTE — PATIENT INSTRUCTIONS
Diabetes,   A1c much better  Continue jardiance daily  Continue metformin 500mg daily  Increase ozempic to .5mg weekly  Get annual eye exam    Cholesterol, LDL goal under 100, up slightly, work on diet  If still over 100 in 6 months would increase atorvastatin    Call [phone] to schedule mammogram    Colonoscopy due in 2026, biopsy normal in 2016    Bp normal on repeat    I recommend that you get the shingrix shots. Shingles vaccination requires a 2 shots series divided by 2 to 6 months. Get at the pharmacy.  Shingrix can make you feel tired and achy for a few days after so do not get it prior to a big event     Get Tdap at the pharmacy, recommended every 10 years (plus need pertussis if babysitting your new grandson)    Pap normal in 2023    Recheck in 6 months    You had the hepatitis b shots

## 2024-07-26 ENCOUNTER — PHARMACY VISIT (OUTPATIENT)
Dept: PHARMACY | Facility: CLINIC | Age: 59
End: 2024-07-26
Payer: COMMERCIAL

## 2024-07-26 PROCEDURE — RXMED WILLOW AMBULATORY MEDICATION CHARGE

## 2024-07-30 PROCEDURE — RXMED WILLOW AMBULATORY MEDICATION CHARGE

## 2024-07-31 ENCOUNTER — PHARMACY VISIT (OUTPATIENT)
Dept: PHARMACY | Facility: CLINIC | Age: 59
End: 2024-07-31
Payer: COMMERCIAL

## 2024-08-02 ENCOUNTER — HOSPITAL ENCOUNTER (OUTPATIENT)
Dept: RADIOLOGY | Facility: HOSPITAL | Age: 59
Discharge: HOME | End: 2024-08-02
Payer: COMMERCIAL

## 2024-08-02 VITALS — HEIGHT: 67 IN | WEIGHT: 178 LBS | BODY MASS INDEX: 27.94 KG/M2

## 2024-08-02 DIAGNOSIS — Z12.31 VISIT FOR SCREENING MAMMOGRAM: ICD-10-CM

## 2024-08-02 PROCEDURE — 77067 SCR MAMMO BI INCL CAD: CPT

## 2024-08-20 PROCEDURE — RXMED WILLOW AMBULATORY MEDICATION CHARGE

## 2024-08-26 ENCOUNTER — PHARMACY VISIT (OUTPATIENT)
Dept: PHARMACY | Facility: CLINIC | Age: 59
End: 2024-08-26
Payer: COMMERCIAL

## 2024-09-10 PROCEDURE — RXMED WILLOW AMBULATORY MEDICATION CHARGE

## 2024-09-12 ENCOUNTER — PHARMACY VISIT (OUTPATIENT)
Dept: PHARMACY | Facility: CLINIC | Age: 59
End: 2024-09-12
Payer: COMMERCIAL

## 2024-09-16 DIAGNOSIS — I10 BENIGN ESSENTIAL HYPERTENSION: ICD-10-CM

## 2024-09-16 DIAGNOSIS — E11.9 TYPE 2 DIABETES MELLITUS WITHOUT COMPLICATION, WITHOUT LONG-TERM CURRENT USE OF INSULIN (MULTI): ICD-10-CM

## 2024-09-16 DIAGNOSIS — E78.5 HYPERLIPIDEMIA, UNSPECIFIED HYPERLIPIDEMIA TYPE: ICD-10-CM

## 2024-09-16 DIAGNOSIS — E13.69 OTHER SPECIFIED DIABETES MELLITUS WITH OTHER SPECIFIED COMPLICATION, UNSPECIFIED WHETHER LONG TERM INSULIN USE (MULTI): ICD-10-CM

## 2024-09-17 RX ORDER — METFORMIN HYDROCHLORIDE 1000 MG/1
1000 TABLET ORAL 2 TIMES DAILY
Qty: 180 TABLET | Refills: 1 | Status: SHIPPED | OUTPATIENT
Start: 2024-09-17

## 2024-09-17 RX ORDER — LISINOPRIL 5 MG/1
5 TABLET ORAL DAILY
Qty: 90 TABLET | Refills: 1 | Status: SHIPPED | OUTPATIENT
Start: 2024-09-17

## 2024-09-17 RX ORDER — ATORVASTATIN CALCIUM 10 MG/1
10 TABLET, FILM COATED ORAL DAILY
Qty: 90 TABLET | Refills: 1 | Status: SHIPPED | OUTPATIENT
Start: 2024-09-17

## 2024-09-18 PROCEDURE — RXMED WILLOW AMBULATORY MEDICATION CHARGE

## 2024-09-26 ENCOUNTER — PHARMACY VISIT (OUTPATIENT)
Dept: PHARMACY | Facility: CLINIC | Age: 59
End: 2024-09-26
Payer: COMMERCIAL

## 2024-10-23 PROCEDURE — RXMED WILLOW AMBULATORY MEDICATION CHARGE

## 2024-10-28 ENCOUNTER — PHARMACY VISIT (OUTPATIENT)
Dept: PHARMACY | Facility: CLINIC | Age: 59
End: 2024-10-28
Payer: COMMERCIAL

## 2024-10-29 ENCOUNTER — PHARMACY VISIT (OUTPATIENT)
Dept: PHARMACY | Facility: CLINIC | Age: 59
End: 2024-10-29
Payer: COMMERCIAL

## 2024-10-29 PROCEDURE — RXMED WILLOW AMBULATORY MEDICATION CHARGE

## 2024-11-19 ENCOUNTER — PHARMACY VISIT (OUTPATIENT)
Dept: PHARMACY | Facility: CLINIC | Age: 59
End: 2024-11-19
Payer: COMMERCIAL

## 2024-11-19 PROCEDURE — RXMED WILLOW AMBULATORY MEDICATION CHARGE

## 2024-11-25 PROCEDURE — RXMED WILLOW AMBULATORY MEDICATION CHARGE

## 2024-12-02 PROCEDURE — RXMED WILLOW AMBULATORY MEDICATION CHARGE

## 2024-12-10 ENCOUNTER — PHARMACY VISIT (OUTPATIENT)
Dept: PHARMACY | Facility: CLINIC | Age: 59
End: 2024-12-10
Payer: COMMERCIAL

## 2024-12-10 PROCEDURE — RXMED WILLOW AMBULATORY MEDICATION CHARGE

## 2024-12-16 ENCOUNTER — PHARMACY VISIT (OUTPATIENT)
Dept: PHARMACY | Facility: CLINIC | Age: 59
End: 2024-12-16
Payer: COMMERCIAL

## 2025-01-02 PROCEDURE — RXMED WILLOW AMBULATORY MEDICATION CHARGE

## 2025-01-08 ENCOUNTER — PHARMACY VISIT (OUTPATIENT)
Dept: PHARMACY | Facility: CLINIC | Age: 60
End: 2025-01-08
Payer: COMMERCIAL

## 2025-01-22 ENCOUNTER — PHARMACY VISIT (OUTPATIENT)
Dept: PHARMACY | Facility: CLINIC | Age: 60
End: 2025-01-22
Payer: COMMERCIAL

## 2025-01-22 PROCEDURE — RXMED WILLOW AMBULATORY MEDICATION CHARGE

## 2025-01-29 ENCOUNTER — TELEPHONE (OUTPATIENT)
Dept: PRIMARY CARE | Facility: CLINIC | Age: 60
End: 2025-01-29
Payer: COMMERCIAL

## 2025-01-29 NOTE — TELEPHONE ENCOUNTER
Pt would like to have her blood work done prior to April ov with CHRISTI RUBY.   Can these be put in the system for her? Stated she is diabetic.  Pt would appreciate call if/when the order is in the system.

## 2025-01-29 NOTE — TELEPHONE ENCOUNTER
mike Kaur routed conversation to [facility] Clinical Support Staff59 minutes ago (3:57 PM)     Carito Kaur59 minutes ago (3:57 PM)     VG  Pt would like to have her blood work done prior to April ov with CHRISTI RUBY.   Can these be put in the system for her? Stated she is diabetic.  Pt would appreciate call if/when the order is in the system.          Note        Bethany Mullen [phone]  Carito Kaur   Pt informed to wait until rtc.

## 2025-02-03 PROCEDURE — RXMED WILLOW AMBULATORY MEDICATION CHARGE

## 2025-02-04 ENCOUNTER — PHARMACY VISIT (OUTPATIENT)
Dept: PHARMACY | Facility: CLINIC | Age: 60
End: 2025-02-04
Payer: COMMERCIAL

## 2025-02-06 DIAGNOSIS — E11.9 TYPE 2 DIABETES MELLITUS WITHOUT COMPLICATION, WITHOUT LONG-TERM CURRENT USE OF INSULIN (MULTI): ICD-10-CM

## 2025-02-12 PROCEDURE — RXMED WILLOW AMBULATORY MEDICATION CHARGE

## 2025-02-17 ENCOUNTER — PHARMACY VISIT (OUTPATIENT)
Dept: PHARMACY | Facility: CLINIC | Age: 60
End: 2025-02-17
Payer: COMMERCIAL

## 2025-02-26 PROCEDURE — RXMED WILLOW AMBULATORY MEDICATION CHARGE

## 2025-03-09 DIAGNOSIS — E13.69 OTHER SPECIFIED DIABETES MELLITUS WITH OTHER SPECIFIED COMPLICATION, UNSPECIFIED WHETHER LONG TERM INSULIN USE (MULTI): ICD-10-CM

## 2025-03-09 DIAGNOSIS — I10 BENIGN ESSENTIAL HYPERTENSION: ICD-10-CM

## 2025-03-09 DIAGNOSIS — E78.5 HYPERLIPIDEMIA, UNSPECIFIED HYPERLIPIDEMIA TYPE: ICD-10-CM

## 2025-03-10 PROCEDURE — RXMED WILLOW AMBULATORY MEDICATION CHARGE

## 2025-03-10 RX ORDER — LISINOPRIL 5 MG/1
5 TABLET ORAL DAILY
Qty: 30 TABLET | Refills: 0 | Status: SHIPPED | OUTPATIENT
Start: 2025-03-10

## 2025-03-10 RX ORDER — ATORVASTATIN CALCIUM 10 MG/1
10 TABLET, FILM COATED ORAL DAILY
Qty: 30 TABLET | Refills: 0 | Status: SHIPPED | OUTPATIENT
Start: 2025-03-10

## 2025-03-11 PROCEDURE — RXMED WILLOW AMBULATORY MEDICATION CHARGE

## 2025-03-17 ENCOUNTER — PHARMACY VISIT (OUTPATIENT)
Dept: PHARMACY | Facility: CLINIC | Age: 60
End: 2025-03-17
Payer: COMMERCIAL

## 2025-04-09 DIAGNOSIS — E11.9 TYPE 2 DIABETES MELLITUS WITHOUT COMPLICATION, WITHOUT LONG-TERM CURRENT USE OF INSULIN: ICD-10-CM

## 2025-04-09 PROCEDURE — RXMED WILLOW AMBULATORY MEDICATION CHARGE

## 2025-04-09 RX ORDER — SEMAGLUTIDE 0.68 MG/ML
0.5 INJECTION, SOLUTION SUBCUTANEOUS
Qty: 3 ML | Refills: 5 | OUTPATIENT
Start: 2025-04-13

## 2025-04-10 DIAGNOSIS — E11.9 TYPE 2 DIABETES MELLITUS WITHOUT COMPLICATION, WITHOUT LONG-TERM CURRENT USE OF INSULIN: ICD-10-CM

## 2025-04-10 RX ORDER — SEMAGLUTIDE 0.68 MG/ML
0.5 INJECTION, SOLUTION SUBCUTANEOUS
Qty: 3 ML | Refills: 5 | Status: CANCELLED | OUTPATIENT
Start: 2025-04-13

## 2025-04-11 ENCOUNTER — PHARMACY VISIT (OUTPATIENT)
Dept: PHARMACY | Facility: CLINIC | Age: 60
End: 2025-04-11
Payer: COMMERCIAL

## 2025-04-15 PROBLEM — L21.9 SEBORRHEIC DERMATITIS: Status: ACTIVE | Noted: 2025-04-15

## 2025-04-15 PROBLEM — H10.10 ALLERGIC CONJUNCTIVITIS: Status: RESOLVED | Noted: 2023-05-16 | Resolved: 2025-04-15

## 2025-04-15 PROBLEM — J02.9 PHARYNGITIS: Status: RESOLVED | Noted: 2023-05-16 | Resolved: 2025-04-15

## 2025-04-15 PROBLEM — J40 BRONCHITIS: Status: RESOLVED | Noted: 2023-05-16 | Resolved: 2025-04-15

## 2025-04-17 ENCOUNTER — APPOINTMENT (OUTPATIENT)
Dept: PRIMARY CARE | Facility: CLINIC | Age: 60
End: 2025-04-17
Payer: COMMERCIAL

## 2025-04-18 ENCOUNTER — APPOINTMENT (OUTPATIENT)
Dept: PRIMARY CARE | Facility: CLINIC | Age: 60
End: 2025-04-18
Payer: COMMERCIAL

## 2025-04-18 VITALS
HEIGHT: 67 IN | BODY MASS INDEX: 27.65 KG/M2 | OXYGEN SATURATION: 97 % | HEART RATE: 99 BPM | WEIGHT: 176.2 LBS | SYSTOLIC BLOOD PRESSURE: 128 MMHG | DIASTOLIC BLOOD PRESSURE: 64 MMHG

## 2025-04-18 DIAGNOSIS — E55.9 VITAMIN D DEFICIENCY: ICD-10-CM

## 2025-04-18 DIAGNOSIS — E11.9 TYPE 2 DIABETES MELLITUS WITHOUT COMPLICATION, WITHOUT LONG-TERM CURRENT USE OF INSULIN: ICD-10-CM

## 2025-04-18 DIAGNOSIS — Z13.220 ENCOUNTER FOR LIPID SCREENING FOR CARDIOVASCULAR DISEASE: ICD-10-CM

## 2025-04-18 DIAGNOSIS — R25.2 LEG CRAMPS: ICD-10-CM

## 2025-04-18 DIAGNOSIS — I10 BENIGN ESSENTIAL HYPERTENSION: ICD-10-CM

## 2025-04-18 DIAGNOSIS — Z13.220 SCREENING FOR CHOLESTEROL LEVEL: ICD-10-CM

## 2025-04-18 DIAGNOSIS — R00.2 PALPITATIONS: ICD-10-CM

## 2025-04-18 DIAGNOSIS — Z00.00 ROUTINE ADULT HEALTH MAINTENANCE: Primary | ICD-10-CM

## 2025-04-18 DIAGNOSIS — E78.5 HYPERLIPIDEMIA, UNSPECIFIED HYPERLIPIDEMIA TYPE: ICD-10-CM

## 2025-04-18 DIAGNOSIS — Z79.899 ON LONG TERM DRUG THERAPY: ICD-10-CM

## 2025-04-18 DIAGNOSIS — Z13.6 ENCOUNTER FOR LIPID SCREENING FOR CARDIOVASCULAR DISEASE: ICD-10-CM

## 2025-04-18 DIAGNOSIS — E13.69 OTHER SPECIFIED DIABETES MELLITUS WITH OTHER SPECIFIED COMPLICATION, UNSPECIFIED WHETHER LONG TERM INSULIN USE (MULTI): ICD-10-CM

## 2025-04-18 DIAGNOSIS — Z13.29 SCREENING FOR THYROID DISORDER: ICD-10-CM

## 2025-04-18 DIAGNOSIS — Z12.31 ENCOUNTER FOR SCREENING MAMMOGRAM FOR MALIGNANT NEOPLASM OF BREAST: ICD-10-CM

## 2025-04-18 DIAGNOSIS — Z13.21 ENCOUNTER FOR VITAMIN DEFICIENCY SCREENING: ICD-10-CM

## 2025-04-18 LAB — POC HEMOGLOBIN A1C: 7.5 % (ref 4.2–6.5)

## 2025-04-18 PROCEDURE — 3074F SYST BP LT 130 MM HG: CPT | Performed by: NURSE PRACTITIONER

## 2025-04-18 PROCEDURE — RXMED WILLOW AMBULATORY MEDICATION CHARGE

## 2025-04-18 PROCEDURE — 3051F HG A1C>EQUAL 7.0%<8.0%: CPT | Performed by: NURSE PRACTITIONER

## 2025-04-18 PROCEDURE — 3008F BODY MASS INDEX DOCD: CPT | Performed by: NURSE PRACTITIONER

## 2025-04-18 PROCEDURE — 3078F DIAST BP <80 MM HG: CPT | Performed by: NURSE PRACTITIONER

## 2025-04-18 PROCEDURE — 99396 PREV VISIT EST AGE 40-64: CPT | Performed by: NURSE PRACTITIONER

## 2025-04-18 PROCEDURE — 4010F ACE/ARB THERAPY RXD/TAKEN: CPT | Performed by: NURSE PRACTITIONER

## 2025-04-18 PROCEDURE — 83036 HEMOGLOBIN GLYCOSYLATED A1C: CPT | Performed by: NURSE PRACTITIONER

## 2025-04-18 RX ORDER — ATORVASTATIN CALCIUM 10 MG/1
10 TABLET, FILM COATED ORAL DAILY
Qty: 90 TABLET | Refills: 1 | Status: SHIPPED | OUTPATIENT
Start: 2025-04-18 | End: 2025-10-15

## 2025-04-18 RX ORDER — METFORMIN HYDROCHLORIDE 1000 MG/1
1000 TABLET ORAL 2 TIMES DAILY
Qty: 180 TABLET | Refills: 1 | Status: SHIPPED | OUTPATIENT
Start: 2025-04-18

## 2025-04-18 RX ORDER — LISINOPRIL 5 MG/1
5 TABLET ORAL DAILY
Qty: 90 TABLET | Refills: 1 | Status: SHIPPED | OUTPATIENT
Start: 2025-04-18 | End: 2025-10-15

## 2025-04-18 RX ORDER — CLOBETASOL PROPIONATE 0.5 MG/G
1 OINTMENT TOPICAL 2 TIMES DAILY
COMMUNITY

## 2025-04-18 ASSESSMENT — ENCOUNTER SYMPTOMS
DIZZINESS: 0
BRUISES/BLEEDS EASILY: 0
ARTHRALGIAS: 1
HEADACHES: 0
DIARRHEA: 0
EYE PAIN: 0
TROUBLE SWALLOWING: 0
WHEEZING: 0
JOINT SWELLING: 0
FEVER: 0
ABDOMINAL DISTENTION: 0
ADENOPATHY: 0
DEPRESSION: 0
PALPITATIONS: 0
NAUSEA: 0
SEIZURES: 0
MYALGIAS: 0
OCCASIONAL FEELINGS OF UNSTEADINESS: 0
COLOR CHANGE: 0
CONSTIPATION: 0
CHILLS: 0
SHORTNESS OF BREATH: 0
COUGH: 0
WOUND: 0
ABDOMINAL PAIN: 0
DIFFICULTY URINATING: 0
LOSS OF SENSATION IN FEET: 0
WEAKNESS: 0
FATIGUE: 0
BACK PAIN: 1

## 2025-04-18 ASSESSMENT — COLUMBIA-SUICIDE SEVERITY RATING SCALE - C-SSRS
2. HAVE YOU ACTUALLY HAD ANY THOUGHTS OF KILLING YOURSELF?: NO
6. HAVE YOU EVER DONE ANYTHING, STARTED TO DO ANYTHING, OR PREPARED TO DO ANYTHING TO END YOUR LIFE?: NO
1. IN THE PAST MONTH, HAVE YOU WISHED YOU WERE DEAD OR WISHED YOU COULD GO TO SLEEP AND NOT WAKE UP?: NO

## 2025-04-18 ASSESSMENT — PATIENT HEALTH QUESTIONNAIRE - PHQ9
1. LITTLE INTEREST OR PLEASURE IN DOING THINGS: NOT AT ALL
2. FEELING DOWN, DEPRESSED OR HOPELESS: NOT AT ALL
SUM OF ALL RESPONSES TO PHQ9 QUESTIONS 1 AND 2: 0

## 2025-04-18 NOTE — PROGRESS NOTES
Do you have:  Any eye problems:  Y  2. Frequent nasal congestion or sneezing:  N  3. Difficulty hearing:  N  4. Ear problems:   N  Are you troubled by:  5. Asthma or wheezing:   N  6. Frequent cough:   N  7. Shortness of breath:N  8. Hemoptysis: N  9. Hx of TB: N  Do you have or have you been told you had:  10. High blood pressure: Y  11. Heart disease: N  12. Heart murmur: N  Do you ever have:  13. Chest pain or pressure with exertion:N  14. Leg pains with walking up hill: N  15. Fast heartbeat or palpitations:N  16. Varicose veins: N  Do you have or are you troubled by:  17. Difficulty swallowing foods or liquids: N  18. Abdominal pains: N  19. Frequent indigestion or heartburn: N  20. Constipation: N  21. Diarrhea or loose stools: N  Has there been a definite change:  22. In weight recently: Y  23. In bowel movements: N  Have you ever had or been told you have:  24. An ulcer: N  25. Black stools: N  26. Jaundice, hepatitis or liver problems: N  27. Gallstones or gallbladder problems: N  28. Stomach or intestinal problems: N  Have you ever:  29. Vomited blood : N  30. Blood in bowel movements: N  31. Been anemic or been treated for blood problems: N  32. Had sickle cell trait or anemia: N  33. Been refused as a blood donor:   Have you had or do you have:  34. Problems with your kidney, bladder, or prostate: N  35. Loss of control of your urine: N  36. Pain or burning with urination: N  37. Blood in your urine: N  38. Trouble starting flow of urine: N  39. Frequent urination at night: N  Have you ever been treated for or told you had:  40. Venereal disease: N  Do you have:  41. Any skin problems: Y  42. Diabetes: Y  43. Thyroid disease: N  Are you troubled by:  44. Frequent back pain: N  45. Pain or swelling around joints: N  Have you ever:  46. Broken any bones: Y  Are you troubled by:  47. Frequent headaches: N  48. Dizziness: N  49. Had Seizures or convulsions: N  50. Temporarily lost control of your hand or  foot : N   51. Had a stroke or been paralyzed : N  52. Temporarily lost your ability to speak: N  53. Fainted or lost consciousness: N  Have you ever had:  54. Hallucinations: N  55. Much trouble with Nervousness: N  56. Do you take medications for your nerves: N  57. Trouble falling asleep or staying asleep: N  58. Do you feel tired even after a good night sleep: N  59. Do you often feel down in the dumps or depressed: N  60. Do you often feel like crying without any reason: N  61. Do you think that you may be using alcohol excessively: N  62. Do you use any street drugs : N     Do have any other medical problems that are concerning to you :      Subjective   Patient ID: Bethany Mullen is a 59 y.o. female who presents for Annual Exam.    Patient seen today in order to establish primary care as well as complete an annual physical exam.  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  She is  and reports a good support system with family.  Discussed diabetes and although patient does not monitor her blood glucose levels at home she denies any recurrent symptoms related to hypoglycemia or hyperglycemia.  Patient reports walking outside when the weather is nice.  No reported issues with shortness of breath or chest pain upon exertion.  She does not smoke and only minimally drinks alcohol.  Occasional palpitations reported with increased stress.  Patient states these are self-limiting and denies any associated lightheaded/dizziness, chest pain or pressure.  No reported issues with appetite, staying hydrated, bowel or bladder.  Discussed repeating Pap exam and agreed on repeat testing in 2026.  Patient is currently asymptomatic but does have a history of HSV.  She reports intermittent issues with nighttime leg cramps.  Encouraged increased fluid intake, stretching and possible magnesium supplements for improved symptoms.  Patient following routinely with dermatology for history of  "psoriasis.  No other acute skin concerns reported.  Intermittent back/knee pain reported that appears to be tolerable at this time.  No significant issues with mood or insomnia.  Patient denies any major concerns with vision or dentition at this time.  Medications reviewed.  No other acute concerns voiced at this time           Medications Ordered Prior to Encounter[1]    Medical History[2]     Surgical History[3]     Family History[4]     Review of Systems   Constitutional:  Negative for chills, fatigue and fever.   HENT:  Negative for dental problem and trouble swallowing.    Eyes:  Negative for pain and visual disturbance.        Wears glasses   Respiratory:  Negative for cough, shortness of breath and wheezing.    Cardiovascular:  Negative for chest pain, palpitations and leg swelling.   Gastrointestinal:  Negative for abdominal distention, abdominal pain, constipation, diarrhea and nausea.   Endocrine: Negative for cold intolerance and heat intolerance.   Genitourinary:  Negative for difficulty urinating.        Reported nocturia   Musculoskeletal:  Positive for arthralgias and back pain. Negative for gait problem, joint swelling and myalgias.   Skin:  Negative for color change, pallor, rash and wound.   Allergic/Immunologic: Negative for environmental allergies and food allergies.   Neurological:  Negative for dizziness, seizures, weakness and headaches.   Hematological:  Negative for adenopathy. Does not bruise/bleed easily.   Psychiatric/Behavioral:  Negative for behavioral problems.    All other systems reviewed and are negative.      Objective   /64   Pulse 99   Ht 1.689 m (5' 6.5\")   Wt 79.9 kg (176 lb 3.2 oz)   SpO2 97%   BMI 28.01 kg/m²     Physical Exam  Constitutional:       General: She is not in acute distress.     Appearance: Normal appearance. She is not toxic-appearing.   HENT:      Head: Normocephalic and atraumatic.      Right Ear: Tympanic membrane, ear canal and external ear " normal.      Left Ear: Tympanic membrane, ear canal and external ear normal.      Nose: Nose normal.      Mouth/Throat:      Mouth: Mucous membranes are moist.      Pharynx: Oropharynx is clear.   Eyes:      Extraocular Movements: Extraocular movements intact.      Conjunctiva/sclera: Conjunctivae normal.      Pupils: Pupils are equal, round, and reactive to light.   Cardiovascular:      Rate and Rhythm: Normal rate and regular rhythm.      Pulses: Normal pulses.      Heart sounds: Normal heart sounds. No murmur heard.  Pulmonary:      Effort: Pulmonary effort is normal.      Breath sounds: Normal breath sounds. No wheezing.   Abdominal:      General: Bowel sounds are normal.      Palpations: Abdomen is soft.   Musculoskeletal:         General: No swelling. Normal range of motion.      Cervical back: Normal range of motion and neck supple.   Skin:     General: Skin is warm and dry.   Neurological:      General: No focal deficit present.      Mental Status: She is alert and oriented to person, place, and time. Mental status is at baseline.      Cranial Nerves: No cranial nerve deficit.      Motor: No weakness.   Psychiatric:         Mood and Affect: Mood normal.         Behavior: Behavior normal.         Thought Content: Thought content normal.         Judgment: Judgment normal.         Assessment/Plan   Problem List Items Addressed This Visit           ICD-10-CM    Benign essential hypertension I10    Stable on current medication regiment.  Continue to monitor patient's vital signs at subsequent visits.  Provider to be notified for any new cardiac concerns         Relevant Medications    atorvastatin (Lipitor) 10 mg tablet    lisinopril 5 mg tablet    metFORMIN (Glucophage) 1,000 mg tablet    Diabetes mellitus (Multi) E11.9    Lab Results   Component Value Date    HGBA1C 7.5 (A) 04/18/2025     Patient would like to increase Ozempic for additional weight loss purposes.  Provider to be notified for any symptoms  associated with hypoglycemia or hyperglycemia as medication changes may be indicated at this time.  Will continue to monitor patient's hemoglobin A1c routinely for surveillance purposes         Relevant Medications    semaglutide (OZEMPIC) 1 mg/dose (4 mg/3 mL) pen injector    atorvastatin (Lipitor) 10 mg tablet    empagliflozin (Jardiance) 25 mg tablet    lisinopril 5 mg tablet    metFORMIN (Glucophage) 1,000 mg tablet    Other Relevant Orders    POCT glycosylated hemoglobin (Hb A1C) manually resulted (Completed)    Albumin-Creatinine Ratio, Urine Random (Completed)    Comprehensive Metabolic Panel    CBC and Auto Differential    Hyperlipidemia E78.5    Patient continues on daily statin without issue.  Will continue to monitor fasting cholesterol panels routinely for surveillance purposes         Relevant Medications    atorvastatin (Lipitor) 10 mg tablet    lisinopril 5 mg tablet    metFORMIN (Glucophage) 1,000 mg tablet    Palpitations R00.2    Self-limiting in nature and reportedly stress-induced?  Patient to notify provider for any persistent/worsening cardiac concerns as an addition assessment will be indicated at that time         Routine adult health maintenance - Primary Z00.00    Routine blood work ordered today for assessment purposes.  Will continue to monitor as appropriate.  -Colonoscopy due in 2026, biopsy normal in 2016  - Orders placed today for screening mammogram to be completed  -Patient will be due for Pap/pelvic exam in 2026         Relevant Medications    magnesium glycinate 100 mg tablet    Other Relevant Orders    Comprehensive Metabolic Panel    TSH with reflex to Free T4 if abnormal    Lipid Panel    Vitamin D 25-Hydroxy,Total (for eval of Vitamin D levels)    CBC and Auto Differential    BI mammo bilateral screening tomosynthesis    Leg cramps R25.2    Encouraged patient to maintain adequate hydration, stretch prior to sleeping and consider taking low-dose magnesium supplements.  Provider  to be notified for any persistent/worsening leg concerns          Other Visit Diagnoses         Codes      Screening for thyroid disorder     Z13.29    Relevant Orders    TSH with reflex to Free T4 if abnormal      Screening for cholesterol level     Z13.220    Relevant Orders    Lipid Panel      Encounter for lipid screening for cardiovascular disease     Z13.220, Z13.6    Relevant Orders    Lipid Panel      Encounter for vitamin deficiency screening     Z13.21    Relevant Orders    Vitamin D 25-Hydroxy,Total (for eval of Vitamin D levels)      On long term drug therapy     Z79.899    Relevant Orders    Vitamin D 25-Hydroxy,Total (for eval of Vitamin D levels)      Vitamin D deficiency     E55.9    Relevant Orders    Vitamin D 25-Hydroxy,Total (for eval of Vitamin D levels)      Encounter for screening mammogram for malignant neoplasm of breast     Z12.31    Relevant Orders    BI mammo bilateral screening tomosynthesis                    [1]   Current Outpatient Medications on File Prior to Visit   Medication Sig Dispense Refill    ascorbic acid (Vitamin C) 1,000 mg tablet Take 1 tablet (1,000 mg) by mouth once daily.      clobetasol (Temovate) 0.05 % ointment Apply 1 Application topically 2 times a day.      ergocalciferol (Vitamin D2) 1.25 MG (95280 UT) capsule Take by mouth 1 (one) time per week.      multivitamin tablet [facility] woman's vitamin , one daily      [DISCONTINUED] atorvastatin (Lipitor) 10 mg tablet Take 1 tablet (10 mg) by mouth once daily. 30 tablet 0    [DISCONTINUED] empagliflozin (Jardiance) 25 mg tablet TAKE 1 TABLET BY MOUTH ONCE DAILY 90 tablet 0    [DISCONTINUED] lisinopril 5 mg tablet Take 1 tablet (5 mg) by mouth once daily. 30 tablet 0    [DISCONTINUED] magnesium 30 mg tablet Take 1 tablet (30 mg) by mouth 2 times a day.      [DISCONTINUED] metFORMIN (Glucophage) 1,000 mg tablet Take 1 tablet (1,000 mg) by mouth 2 times a day. 180 tablet 1    [DISCONTINUED] semaglutide 0.25 mg or 0.5 mg (2  mg/3 mL) pen injector Inject 0.5 mg under the skin 1 (one) time per week. 3 mL 5     No current facility-administered medications on file prior to visit.   [2]   Past Medical History:  Diagnosis Date    Personal history of other endocrine, nutritional and metabolic disease     History of diabetes mellitus    Pure hypercholesterolemia, unspecified     High cholesterol   [3]   Past Surgical History:  Procedure Laterality Date    ADENOIDECTOMY  2016    Adenoidectomy   [4]   Family History  Problem Relation Name Age of Onset    Other (healthy) Mother      Heart attack Father           at 81    Coronary artery disease Father      Hyperlipidemia Father

## 2025-04-18 NOTE — PATIENT INSTRUCTIONS
Please arrange for routine follow up in 3 months. You may schedule on-line through [facility] or call our office at [phone].    Orders are in place for you to have fasting blood work completed. Please do not eat or drink 8 hours prior to having your blood drawn.   You may have your blood work completed in this building through [facility] ([address], [place] [address]).  For this location, you may schedule an appointment online or drop-in for walk-in services. [email or web address]  Hours:   Monday - Friday: 7:30 a.m. - 5 p.m. and Saturday: 8 a.m. - 11 a.m.  Phone:  [phone]

## 2025-04-19 LAB
ALBUMIN/CREAT UR: 9 MG/G CREAT
CREAT UR-MCNC: 23 MG/DL (ref 20–275)
MICROALBUMIN UR-MCNC: 0.2 MG/DL

## 2025-04-21 PROBLEM — Z00.00 ROUTINE ADULT HEALTH MAINTENANCE: Status: ACTIVE | Noted: 2025-04-21

## 2025-04-21 PROBLEM — F51.01 IDIOPATHIC INSOMNIA: Status: RESOLVED | Noted: 2023-05-16 | Resolved: 2025-04-21

## 2025-04-21 PROBLEM — N95.1 HOT FLASH, MENOPAUSAL: Status: RESOLVED | Noted: 2023-05-16 | Resolved: 2025-04-21

## 2025-04-21 PROBLEM — R25.2 LEG CRAMPS: Status: ACTIVE | Noted: 2025-04-21

## 2025-04-21 PROBLEM — Z86.39 HISTORY OF DIABETES MELLITUS: Status: RESOLVED | Noted: 2024-01-26 | Resolved: 2025-04-21

## 2025-04-21 NOTE — ASSESSMENT & PLAN NOTE
Routine blood work ordered today for assessment purposes.  Will continue to monitor as appropriate.  -Colonoscopy due in 2026, biopsy normal in 2016  - Orders placed today for screening mammogram to be completed  -Patient will be due for Pap/pelvic exam in 2026

## 2025-04-21 NOTE — ASSESSMENT & PLAN NOTE
Patient continues on daily statin without issue.  Will continue to monitor fasting cholesterol panels routinely for surveillance purposes

## 2025-04-21 NOTE — ASSESSMENT & PLAN NOTE
Stable on current medication regiment.  Continue to monitor patient's vital signs at subsequent visits.  Provider to be notified for any new cardiac concerns

## 2025-04-21 NOTE — ASSESSMENT & PLAN NOTE
Lab Results   Component Value Date    HGBA1C 7.5 (A) 04/18/2025     Patient would like to increase Ozempic for additional weight loss purposes.  Provider to be notified for any symptoms associated with hypoglycemia or hyperglycemia as medication changes may be indicated at this time.  Will continue to monitor patient's hemoglobin A1c routinely for surveillance purposes

## 2025-04-21 NOTE — ASSESSMENT & PLAN NOTE
Self-limiting in nature and reportedly stress-induced?  Patient to notify provider for any persistent/worsening cardiac concerns as an addition assessment will be indicated at that time

## 2025-04-21 NOTE — ASSESSMENT & PLAN NOTE
Encouraged patient to maintain adequate hydration, stretch prior to sleeping and consider taking low-dose magnesium supplements.  Provider to be notified for any persistent/worsening leg concerns

## 2025-04-23 ENCOUNTER — PHARMACY VISIT (OUTPATIENT)
Dept: PHARMACY | Facility: CLINIC | Age: 60
End: 2025-04-23
Payer: COMMERCIAL

## 2025-04-23 PROCEDURE — RXMED WILLOW AMBULATORY MEDICATION CHARGE

## 2025-04-23 RX ORDER — CLOBETASOL PROPIONATE 0.5 MG/G
OINTMENT TOPICAL
Qty: 60 G | Refills: 1 | OUTPATIENT
Start: 2025-04-23

## 2025-04-23 RX ORDER — CLOBETASOL PROPIONATE 0.5 MG/ML
SOLUTION TOPICAL
Qty: 50 ML | Refills: 2 | OUTPATIENT
Start: 2025-04-18

## 2025-04-24 ENCOUNTER — APPOINTMENT (OUTPATIENT)
Dept: PRIMARY CARE | Facility: CLINIC | Age: 60
End: 2025-04-24
Payer: COMMERCIAL

## 2025-04-29 ENCOUNTER — PHARMACY VISIT (OUTPATIENT)
Dept: PHARMACY | Facility: CLINIC | Age: 60
End: 2025-04-29
Payer: COMMERCIAL

## 2025-05-02 PROCEDURE — RXMED WILLOW AMBULATORY MEDICATION CHARGE

## 2025-05-08 ENCOUNTER — PHARMACY VISIT (OUTPATIENT)
Dept: PHARMACY | Facility: CLINIC | Age: 60
End: 2025-05-08
Payer: COMMERCIAL

## 2025-05-16 PROCEDURE — RXMED WILLOW AMBULATORY MEDICATION CHARGE

## 2025-05-20 ENCOUNTER — PHARMACY VISIT (OUTPATIENT)
Dept: PHARMACY | Facility: CLINIC | Age: 60
End: 2025-05-20
Payer: COMMERCIAL

## 2025-05-20 PROCEDURE — RXMED WILLOW AMBULATORY MEDICATION CHARGE

## 2025-05-27 PROCEDURE — RXMED WILLOW AMBULATORY MEDICATION CHARGE

## 2025-05-29 ENCOUNTER — PHARMACY VISIT (OUTPATIENT)
Dept: PHARMACY | Facility: CLINIC | Age: 60
End: 2025-05-29
Payer: COMMERCIAL

## 2025-06-13 ENCOUNTER — APPOINTMENT (OUTPATIENT)
Facility: CLINIC | Age: 60
End: 2025-06-13
Payer: COMMERCIAL

## 2025-07-08 ENCOUNTER — TELEPHONE (OUTPATIENT)
Dept: PRIMARY CARE | Facility: CLINIC | Age: 60
End: 2025-07-08
Payer: COMMERCIAL

## 2025-07-08 DIAGNOSIS — E11.9 TYPE 2 DIABETES MELLITUS WITHOUT COMPLICATION, WITHOUT LONG-TERM CURRENT USE OF INSULIN: Primary | ICD-10-CM

## 2025-07-08 NOTE — TELEPHONE ENCOUNTER
PT states they have discussed with the [facility] retail pharmacy regarding their new insurance for their medication coverage. The PT states the pharmacy suggested that she could switch two of her RX for different brands due to the new insurance coverages.    The first one is switching ozempic to mounjaro. The second one is switching from jardiance to farxiga, the farxiga would require a prior auth.    The PT would like a call back to discuss the possibility of making the switch.

## 2025-07-14 DIAGNOSIS — E11.9 TYPE 2 DIABETES MELLITUS WITHOUT COMPLICATION, WITHOUT LONG-TERM CURRENT USE OF INSULIN: Primary | ICD-10-CM

## 2025-07-14 PROCEDURE — RXMED WILLOW AMBULATORY MEDICATION CHARGE

## 2025-07-14 RX ORDER — TIRZEPATIDE 2.5 MG/.5ML
2.5 INJECTION, SOLUTION SUBCUTANEOUS WEEKLY
Qty: 2 ML | Refills: 2 | Status: SHIPPED | OUTPATIENT
Start: 2025-07-14 | End: 2025-10-06

## 2025-07-14 RX ORDER — DAPAGLIFLOZIN 5 MG/1
5 TABLET, FILM COATED ORAL DAILY
Qty: 30 TABLET | Refills: 2 | Status: SHIPPED | OUTPATIENT
Start: 2025-07-14 | End: 2025-10-12

## 2025-07-14 NOTE — TELEPHONE ENCOUNTER
Can you please update the patient that I sent a prescription of Farxiga to [facility] pharmacy per clinical pharmacy recommendations?  They are trying to see if this will be covered under her new insurance plan.  I also spent Mounjaro to  drug pharmacy as well.  She should take this 1 week after stopping Ozempic.  Please have her notify the office for any additional questions or concerns.

## 2025-07-22 ENCOUNTER — PHARMACY VISIT (OUTPATIENT)
Dept: PHARMACY | Facility: CLINIC | Age: 60
End: 2025-07-22
Payer: COMMERCIAL

## 2025-07-23 ENCOUNTER — TELEPHONE (OUTPATIENT)
Dept: PRIMARY CARE | Facility: CLINIC | Age: 60
End: 2025-07-23

## 2025-07-31 PROCEDURE — RXMED WILLOW AMBULATORY MEDICATION CHARGE

## 2025-08-05 ENCOUNTER — PHARMACY VISIT (OUTPATIENT)
Dept: PHARMACY | Facility: CLINIC | Age: 60
End: 2025-08-05
Payer: COMMERCIAL

## 2025-08-06 RX ORDER — PHENAZOPYRIDINE HYDROCHLORIDE 200 MG/1
200 TABLET, FILM COATED ORAL
COMMUNITY
Start: 2025-05-31

## 2025-08-08 ENCOUNTER — TELEPHONE (OUTPATIENT)
Dept: PRIMARY CARE | Facility: CLINIC | Age: 60
End: 2025-08-08

## 2025-08-08 ENCOUNTER — APPOINTMENT (OUTPATIENT)
Dept: RADIOLOGY | Facility: HOSPITAL | Age: 60
End: 2025-08-08
Payer: MEDICARE

## 2025-08-08 ENCOUNTER — APPOINTMENT (OUTPATIENT)
Dept: PRIMARY CARE | Facility: CLINIC | Age: 60
End: 2025-08-08
Payer: COMMERCIAL

## 2025-08-08 NOTE — TELEPHONE ENCOUNTER
PT was told to make contact with her insurance to get an alternative on Jardiance. The PT's insurance told her that they would cover Alogliptin, the generic form of Nesina.      PT would like this to be called into the [facility] retail pharmacy.

## 2025-08-08 NOTE — TELEPHONE ENCOUNTER
Spoke with pt and she is currently taking monjaro and she made an appointment to come in and get A1C checked on 8/22 per FLORI

## 2025-08-08 NOTE — TELEPHONE ENCOUNTER
You please update patient that Nesia would actually be of substitute for Januvia, not Jardiance.  These are 2 different diabetic medication classes.  Can you please find out what diabetic medications she is currently taking?  I would also like to get an updated hemoglobin A1c and her before we make any significant medication changes.  Is there any way we could squeeze her in for a 20-minute visit sometime next week?

## 2025-08-09 ENCOUNTER — HOSPITAL ENCOUNTER (OUTPATIENT)
Dept: RADIOLOGY | Facility: HOSPITAL | Age: 60
Discharge: HOME | End: 2025-08-09
Payer: MEDICARE

## 2025-08-09 VITALS — HEIGHT: 66 IN | WEIGHT: 179 LBS | BODY MASS INDEX: 28.77 KG/M2

## 2025-08-09 DIAGNOSIS — Z12.31 ENCOUNTER FOR SCREENING MAMMOGRAM FOR MALIGNANT NEOPLASM OF BREAST: ICD-10-CM

## 2025-08-09 DIAGNOSIS — Z00.00 ROUTINE ADULT HEALTH MAINTENANCE: ICD-10-CM

## 2025-08-09 PROCEDURE — 77063 BREAST TOMOSYNTHESIS BI: CPT | Performed by: RADIOLOGY

## 2025-08-09 PROCEDURE — 77067 SCR MAMMO BI INCL CAD: CPT | Performed by: RADIOLOGY

## 2025-08-09 PROCEDURE — 77067 SCR MAMMO BI INCL CAD: CPT

## 2025-08-10 LAB
25(OH)D3+25(OH)D2 SERPL-MCNC: 51 NG/ML (ref 30–100)
ALBUMIN SERPL-MCNC: 4.7 G/DL (ref 3.6–5.1)
ALP SERPL-CCNC: 74 U/L (ref 37–153)
ALT SERPL-CCNC: 36 U/L (ref 6–29)
ANION GAP SERPL CALCULATED.4IONS-SCNC: 9 MMOL/L (CALC) (ref 7–17)
AST SERPL-CCNC: 23 U/L (ref 10–35)
BASOPHILS # BLD AUTO: 19 CELLS/UL (ref 0–200)
BASOPHILS NFR BLD AUTO: 0.5 %
BILIRUB SERPL-MCNC: 0.6 MG/DL (ref 0.2–1.2)
BUN SERPL-MCNC: 13 MG/DL (ref 7–25)
CALCIUM SERPL-MCNC: 9.5 MG/DL (ref 8.6–10.4)
CHLORIDE SERPL-SCNC: 98 MMOL/L (ref 98–110)
CHOLEST SERPL-MCNC: 172 MG/DL
CHOLEST/HDLC SERPL: 3.4 (CALC)
CO2 SERPL-SCNC: 30 MMOL/L (ref 20–32)
CREAT SERPL-MCNC: 0.45 MG/DL (ref 0.5–1.03)
EGFRCR SERPLBLD CKD-EPI 2021: 111 ML/MIN/1.73M2
EOSINOPHIL # BLD AUTO: 80 CELLS/UL (ref 15–500)
EOSINOPHIL NFR BLD AUTO: 2.1 %
ERYTHROCYTE [DISTWIDTH] IN BLOOD BY AUTOMATED COUNT: 12.3 % (ref 11–15)
GLUCOSE SERPL-MCNC: 196 MG/DL (ref 65–99)
HCT VFR BLD AUTO: 44.3 % (ref 35–45)
HDLC SERPL-MCNC: 50 MG/DL
HGB BLD-MCNC: 14.4 G/DL (ref 11.7–15.5)
LDLC SERPL CALC-MCNC: 95 MG/DL (CALC)
LYMPHOCYTES # BLD AUTO: 912 CELLS/UL (ref 850–3900)
LYMPHOCYTES NFR BLD AUTO: 24 %
MCH RBC QN AUTO: 30.1 PG (ref 27–33)
MCHC RBC AUTO-ENTMCNC: 32.5 G/DL (ref 32–36)
MCV RBC AUTO: 92.7 FL (ref 80–100)
MONOCYTES # BLD AUTO: 346 CELLS/UL (ref 200–950)
MONOCYTES NFR BLD AUTO: 9.1 %
NEUTROPHILS # BLD AUTO: 2443 CELLS/UL (ref 1500–7800)
NEUTROPHILS NFR BLD AUTO: 64.3 %
NONHDLC SERPL-MCNC: 122 MG/DL (CALC)
PLATELET # BLD AUTO: 145 THOUSAND/UL (ref 140–400)
PMV BLD REES-ECKER: 10.1 FL (ref 7.5–12.5)
POTASSIUM SERPL-SCNC: 4.4 MMOL/L (ref 3.5–5.3)
PROT SERPL-MCNC: 7.1 G/DL (ref 6.1–8.1)
RBC # BLD AUTO: 4.78 MILLION/UL (ref 3.8–5.1)
SODIUM SERPL-SCNC: 137 MMOL/L (ref 135–146)
TRIGL SERPL-MCNC: 174 MG/DL
TSH SERPL-ACNC: 4.4 MIU/L (ref 0.4–4.5)
WBC # BLD AUTO: 3.8 THOUSAND/UL (ref 3.8–10.8)

## 2025-08-13 ENCOUNTER — TELEPHONE (OUTPATIENT)
Dept: PRIMARY CARE | Facility: CLINIC | Age: 60
End: 2025-08-13
Payer: MEDICARE

## 2025-08-13 DIAGNOSIS — R73.09 BLOOD GLUCOSE ABNORMAL: ICD-10-CM

## 2025-08-13 DIAGNOSIS — E11.9 TYPE 2 DIABETES MELLITUS WITHOUT COMPLICATION, WITHOUT LONG-TERM CURRENT USE OF INSULIN: Primary | ICD-10-CM

## 2025-08-13 DIAGNOSIS — Z13.1 ENCOUNTER FOR SCREENING FOR DIABETES MELLITUS: ICD-10-CM

## 2025-08-22 ENCOUNTER — APPOINTMENT (OUTPATIENT)
Dept: PRIMARY CARE | Facility: CLINIC | Age: 60
End: 2025-08-22
Payer: MEDICARE

## 2025-08-22 ENCOUNTER — TELEMEDICINE (OUTPATIENT)
Dept: PHARMACY | Facility: HOSPITAL | Age: 60
End: 2025-08-22
Payer: MEDICARE

## 2025-08-22 DIAGNOSIS — E11.9 TYPE 2 DIABETES MELLITUS WITHOUT COMPLICATION, WITHOUT LONG-TERM CURRENT USE OF INSULIN: ICD-10-CM

## 2025-08-30 LAB
EST. AVERAGE GLUCOSE BLD GHB EST-MCNC: 194 MG/DL
EST. AVERAGE GLUCOSE BLD GHB EST-SCNC: 10.8 MMOL/L
HBA1C MFR BLD: 8.4 %

## 2025-09-05 ENCOUNTER — APPOINTMENT (OUTPATIENT)
Dept: PHARMACY | Facility: HOSPITAL | Age: 60
End: 2025-09-05
Payer: MEDICARE

## 2025-10-03 ENCOUNTER — APPOINTMENT (OUTPATIENT)
Dept: PHARMACY | Facility: HOSPITAL | Age: 60
End: 2025-10-03
Payer: MEDICARE

## 2025-10-25 ENCOUNTER — APPOINTMENT (OUTPATIENT)
Dept: PRIMARY CARE | Facility: CLINIC | Age: 60
End: 2025-10-25
Payer: MEDICARE